# Patient Record
Sex: FEMALE | Race: WHITE
[De-identification: names, ages, dates, MRNs, and addresses within clinical notes are randomized per-mention and may not be internally consistent; named-entity substitution may affect disease eponyms.]

---

## 2018-02-27 ENCOUNTER — HOSPITAL ENCOUNTER (EMERGENCY)
Dept: HOSPITAL 56 - MW.ED | Age: 38
Discharge: HOME | End: 2018-02-27
Payer: MEDICAID

## 2018-02-27 DIAGNOSIS — L03.113: Primary | ICD-10-CM

## 2018-02-27 PROCEDURE — 99283 EMERGENCY DEPT VISIT LOW MDM: CPT

## 2018-02-27 PROCEDURE — 96372 THER/PROPH/DIAG INJ SC/IM: CPT

## 2018-02-27 PROCEDURE — 87070 CULTURE OTHR SPECIMN AEROBIC: CPT

## 2018-02-27 NOTE — EDM.PDOC
ED HPI GENERAL MEDICAL PROBLEM





- General


Chief Complaint: Skin Complaint


Stated Complaint: RIGHT ARM POSS SPIDER BITE


Time Seen by Provider: 02/27/18 17:40


Source of Information: Reports: Patient





- History of Present Illness


INITIAL COMMENTS - FREE TEXT/NARRATIVE: 





HISTORY AND PHYSICAL:


History of present illness:


[Patient presents with cellulitis on right anterior forearm 1 inch in diameter, 

she is an IV drug user, has multiple track marks.





He did have an abscess on her posterior forearm that she drained on her own she 

has some exudates that are loculated at the opening there is no fluctuance or 

surrounding redness and tenderness approximately 1 inch in diameter, and wound 

cultures obtained at this





No fever nausea vomiting chills sweats





]


Review of systems: 


As per history of present illness and below otherwise all systems reviewed and 

negative.


Past medical history: 


As per history of present illness and as reviewed below otherwise 

noncontributory.


Surgical history: 


As per history of present illness and as reviewed below otherwise 

noncontributory.


Social history: 


No reported history of drug or alcohol abuse.


Family history: 


As per history of present illness and as reviewed below otherwise 

noncontributory.


Physical exam:


HEENT: Atraumatic, normocephalic, pupils reactive, negative for conjunctival 

pallor or scleral icterus, mucous membranes moist, throat clear, neck supple, 

nontender, trachea midline.


Lungs: Clear to auscultation, breath sounds equal bilaterally, chest nontender.


Heart: S1S2, regular, negative for clicks, rubs, or JVD.


Abdomen: Soft, nondistended, nontender. Negative for masses or 

hepatosplenomegaly. Negative for costovertebral tenderness.


Pelvis: Stable nontender.


Genitourinary: Deferred.


Rectal: Deferred.


Extremities: Atraumatic, negative for cords or calf pain. Neurovascular 

unremarkable.


Neuro: Awake, alert, oriented. Cranial nerves II through XII unremarkable. 

Cerebellum unremarkable. Motor and sensory unremarkable throughout. Exam 

nonfocal.


Diagnostics:


Culture]


Therapeutics:


[1 g Rocephin


Bactrim double strength by mouth twice a day #20 no refill


]


Impression: 


[]Cellulitis right forearm anterior


Abscess with drainage performed by the patient several days ago on the 

posterior forearm cellulitis surrounds post I&D


Definitive disposition and diagnosis as appropriate pending reevaluation and 

review of above.





ED ROS GENERAL





- Review of Systems


Review Of Systems: ROS reveals no pertinent complaints other than HPI.





ED EXAM, SKIN/RASH


Exam: See Below





Course





- Orders/Labs/Meds


Orders: 


 Active Orders 24 hr











 Category Date Time Status


 


 CULTURE WOUND [RM] Stat Lab  02/27/18 17:40 Ordered














Departure





- Departure


Time of Disposition: 17:42


Disposition: Home, Self-Care 01


Condition: Good


Clinical Impression: 


 Cellulitis








- Discharge Information


Referrals: 


PCP,None [Primary Care Provider] - 


Forms:  ED Department Discharge


Additional Instructions: 


Medication as prescribed


Return if symptoms persist or worsen


Follow-up with primary care in 1 week





Wound culture to follow





[facility] - Primary Care


[address] [place] [id number]


Phone: [phone]


Fax: [phone]








The following information is given to patients seen in the emergency department 

who are being discharged to home. This information is to outline your options 

for follow-up care. We provide all patients seen in our emergency department 

with a follow-up referral.





The need for follow-up, as well as the timing and circumstances, are variable 

depending upon the specifics of your emergency department visit.





If you don't have a primary care physician on staff, we will provide you with a 

referral. We always advise you to contact your personal physician following an 

emergency department visit to inform them of the circumstance of the visit and 

for follow-up with them and/or the need for any referrals to a consulting 

specialist.





The emergency department will also refer you to a specialist when appropriate. 

This referral assures that you have the opportunity for follow-up care with a 

specialist. All of these measure are taken in an effort to provide you with 

optimal care, which includes your follow-up.





Under all circumstances we always encourage you to contact your private 

physician who remains a resource for coordinating your care. When calling for 

follow-up care, please make the office aware that this follow-up is from your 

recent emergency room visit. If for any reason you are refused follow-up, 

please contact the [facility] emergency department at [phone] 

and asked to speak to the emergency department charge nurse.














- My Orders


Last 24 Hours: 


My Active Orders





02/27/18 17:40


CULTURE WOUND [RM] Stat 














- Assessment/Plan


Last 24 Hours: 


My Active Orders





02/27/18 17:40


CULTURE WOUND [RM] Stat

## 2018-05-22 ENCOUNTER — HOSPITAL ENCOUNTER (EMERGENCY)
Dept: HOSPITAL 56 - MW.ED | Age: 38
Discharge: NURSING, REHAB OR LONG-TERM CARE | End: 2018-05-22
Payer: MEDICAID

## 2018-05-22 DIAGNOSIS — F19.10: ICD-10-CM

## 2018-05-22 DIAGNOSIS — E11.649: Primary | ICD-10-CM

## 2018-05-22 DIAGNOSIS — R09.2: ICD-10-CM

## 2018-05-22 DIAGNOSIS — Z88.1: ICD-10-CM

## 2018-05-22 DIAGNOSIS — Z79.4: ICD-10-CM

## 2018-05-22 PROCEDURE — 36415 COLL VENOUS BLD VENIPUNCTURE: CPT

## 2018-05-22 PROCEDURE — 99292 CRITICAL CARE ADDL 30 MIN: CPT

## 2018-05-22 PROCEDURE — 82803 BLOOD GASES ANY COMBINATION: CPT

## 2018-05-22 PROCEDURE — 96361 HYDRATE IV INFUSION ADD-ON: CPT

## 2018-05-22 PROCEDURE — 94002 VENT MGMT INPAT INIT DAY: CPT

## 2018-05-22 PROCEDURE — 83605 ASSAY OF LACTIC ACID: CPT

## 2018-05-22 PROCEDURE — 43753 TX GASTRO INTUB W/ASP: CPT

## 2018-05-22 PROCEDURE — 99291 CRITICAL CARE FIRST HOUR: CPT

## 2018-05-22 PROCEDURE — 80305 DRUG TEST PRSMV DIR OPT OBS: CPT

## 2018-05-22 PROCEDURE — 93005 ELECTROCARDIOGRAM TRACING: CPT

## 2018-05-22 PROCEDURE — 71045 X-RAY EXAM CHEST 1 VIEW: CPT

## 2018-05-22 PROCEDURE — 96374 THER/PROPH/DIAG INJ IV PUSH: CPT

## 2018-05-22 PROCEDURE — 82375 ASSAY CARBOXYHB QUANT: CPT

## 2018-05-22 PROCEDURE — 36600 WITHDRAWAL OF ARTERIAL BLOOD: CPT

## 2018-05-22 PROCEDURE — 81001 URINALYSIS AUTO W/SCOPE: CPT

## 2018-05-22 NOTE — CR
EXAM DATE: 18



PATIENT'S AGE: 37





Patient: JENSEN HOBBS



Facility: [place], [place]

Patient ID: [id number]

Site Patient ID: [id number].

Site Accession #: [id number]

: 1980

Study: XRay Chest [id number]/22/2018 12:21:41 PM

Ordering Physician: Maurice Ferreira



Final Report: 

INDICATION: PT FOUND UNRESPONSIVE



TECHNIQUE:

Chest 1 view 



COMPARISON:

None 



FINDINGS:

Cardiovascular and mediastinum: Heart size and vasculature are normal in 
caliber and appearance. Mediastinum is within normal limits. 



Lungs and pleural space: No focal consolidation. No sign of pleural effusion. 
No pneumothorax. 



Bones: No significant findings. 



Other: 



IMPRESSION:

1. no acute cardiopulmonary disease.

2. Feeding tube crossing the left hemidiaphragm looped within the gastric 
fundus.



Dictated by Bryant Arroyo MD @ 2018 12:38:00 PM







Dictated by: Bryant Arroyo MD @ 2018 12:38:08

(Electronic Signature)







Report Signed by Proxy.
KAZ

## 2018-05-22 NOTE — EDM.PDOC
ED HPI GENERAL MEDICAL PROBLEM





- General


Chief Complaint: General


Stated Complaint: AMBULANCE


Time Seen by Provider: 05/22/18 11:46





- History of Present Illness


INITIAL COMMENTS - FREE TEXT/NARRATIVE: 


HISTORY AND PHYSICAL:





History of present illness:


Patient is a 37-year-old female with history of inflammation and diabetes who 

is found unresponsive face down at all tell by cleaning personnel on arrival 

paramedic had a blood sugar of 30 EJ was established 1 amp of D50 was given 

patient remained unresponsive she was intubated in the field on arrival here 

she remains unresponsive Narcan 0.42 was given with no response.





Review of systems: 


As per history of present illness and below otherwise all systems reviewed and 

negative.





Past medical history: 


As per history of present illness and as reviewed below otherwise 

noncontributory.





Surgical history: 


As per history of present illness and as reviewed below otherwise 

noncontributory.





Social history: 


No reported history of drug or alcohol abuse.





Family history: 


As per history of present illness and as reviewed below otherwise 

noncontributory.





Physical exam:


HEENT: Small areas of ecchymosis noted about her face these appear of variable 

age, normocephalic, pupils reactive, negative for conjunctival pallor or 

scleral icterus, mucous membranes moist, throat clear, c-collar applied , 

trachea midline.


Lungs: Clear to auscultation, breath sounds equal bilaterally, chest nontender.


Heart: S1S2, regular, negative for clicks, rubs, or JVD.


Abdomen: Soft, nondistended, nontender. Negative for masses or 

hepatosplenomegaly. Negative for costovertebral tenderness.


Pelvis: Stable nontender.


Genitourinary: Deferred.


Rectal: Deferred.


Extremities: Track marks noted bilateral upper extremities, 


Neuro: Unresponsive intubated spontaneous movements





Diagnostics:


CBC CMP PT/INR troponin chest x-ray CT brain CT C-spine urine drug screen 

aspirin Tylenol level EtOH prolactin blood culture 2 hCG UA ABG


Therapeutics:


IV monitor mechanical ventilation


Impression


#1 altered mental status #2 ventilatory failure #3 probable substance abuse #4 

hypoglycemic episode history of insulin dependent diabetes


Definitive disposition and diagnosis as appropriate pending reevaluation and 

review of above.








- Related Data


 Allergies











Allergy/AdvReac Type Severity Reaction Status Date / Time


 


clarithromycin [From Biaxin] Allergy  Rash Verified 02/27/18 17:51











Home Meds: 


 Home Meds





Insulin Aspart [Novolog Flexpen] 7 units SQ ASDIRECTED 02/27/18 [History]


Insulin Glarg,Human.Rec.Analog [Lantus] 50 units SQ BEDTIME 02/27/18 [History]











Past Medical History





- Past Health History


Medical/Surgical History: Denies Medical/Surgical History


Endocrine/Metabolic History: Reports: Diabetes, Type II





- Infectious Disease History


Infectious Disease History: Reports: Chicken Pox, Other (See Below)


Other Infectious Disease History: MSSA





Social & Family History





- Family History


Family Medical History: Noncontributory





- Tobacco Use


Smoking Status *Q: Unknown Ever Smoked





ED ROS GENERAL





- Review of Systems


Review Of Systems: ROS reveals no pertinent complaints other than HPI.





ED EXAM, GENERAL





- Physical Exam


Exam: See Below (The dictation)





Course





- Orders/Labs/Meds


Orders: 





 Active Orders 24 hr











 Category Date Time Status


 


 Cardiac Monitoring [RC] .AS DIRECTED Care  05/22/18 11:47 Active


 


 EKG Documentation Completion [RC] STAT Care  05/22/18 11:47 Active


 


 Pulse Oximetry [RC] ASDIRECTED Care  05/22/18 11:47 Active


 


 Cervical Spine wo Cont [CT] Stat Exams  05/22/18 11:48 Ordered


 


 Chest 1V Frontal [CR] Stat Exams  05/22/18 11:48 Ordered


 


 Head wo Cont [CT] Stat Exams  05/22/18 11:48 Ordered


 


 ACETAMINOPHEN [CHEM] Stat Lab  05/22/18 11:49 Ordered


 


 AMMONIA VENOUS [CHEM] Stat Lab  05/22/18 11:47 Ordered


 


 BLOOD GAS ARTERIAL [BG] Stat Lab  05/22/18 11:47 Ordered


 


 CARBOXYHEMOGLOBIN [BG] Stat Lab  05/22/18 11:48 Ordered


 


 CBC WITH AUTO DIFF [HEME] Stat Lab  05/22/18 11:47 Ordered


 


 COMPREHENSIVE METABOLIC PN,CMP [CHEM] Stat Lab  05/22/18 11:47 Ordered


 


 DRUG SCREEN, URINE [URCHEM] Stat Lab  05/22/18 11:48 Ordered


 


 ETHANOL BLOOD MEDICAL [CHEM] Stat Lab  05/22/18 11:47 Ordered


 


 HCG QUALITATIVE,SERUM [CHEM] Stat Lab  05/22/18 11:48 Ordered


 


 INR,PT,PROTHROMBIN TIME [COAG] Stat Lab  05/22/18 11:47 Ordered


 


 LACTATE WITH REFLEX [BG] Stat Lab  05/22/18 11:49 Ordered


 


 PROLACTIN [CHEM] Stat Lab  05/22/18 11:47 Ordered


 


 SALICYLATE [CHEM] Stat Lab  05/22/18 11:47 Ordered


 


 TROPONIN I [CHEM] Stat Lab  05/22/18 11:47 Ordered


 


 UA W/MICROSCOPIC [URIN] Stat Lab  05/22/18 11:48 Ordered


 


 Sodium Chloride 0.9% [Saline Flush] Med  05/22/18 11:47 Active





 10 ml FLUSH ASDIRECTED PRN   


 


 Sodium Chloride 0.9% [Saline Flush] Med  05/22/18 11:47 Active





 2.5 ml FLUSH ASDIRECTED PRN   


 


 Saline Lock Insert [OM.PC] Stat Oth  05/22/18 11:47 Ordered








 Medication Orders





Sodium Chloride (Saline Flush)  10 ml FLUSH ASDIRECTED PRN


   PRN Reason: Keep Vein Open


Sodium Chloride (Saline Flush)  2.5 ml FLUSH ASDIRECTED PRN


   PRN Reason: Keep Vein Open








Meds: 





Medications











Generic Name Dose Route Start Last Admin





  Trade Name Freq  PRN Reason Stop Dose Admin


 


Sodium Chloride  10 ml  05/22/18 11:47  





  Saline Flush  FLUSH   





  ASDIRECTED PRN   





  Keep Vein Open   





     





     





     


 


Sodium Chloride  2.5 ml  05/22/18 11:47  





  Saline Flush  FLUSH   





  ASDIRECTED PRN   





  Keep Vein Open   





     





     





     














Departure





- Departure


Time of Disposition: 12:01


Disposition: DC/Tfer to Acute Hospital 02


Condition: Critical


Clinical Impression: 


 Altered mental status, Respiratory arrest, Substance abuse, Hypoglycemia








- Discharge Information





- My Orders


Last 24 Hours: 





My Active Orders





05/22/18 11:47


Cardiac Monitoring [RC] .AS DIRECTED 


EKG Documentation Completion [RC] STAT 


Pulse Oximetry [RC] ASDIRECTED 


AMMONIA VENOUS [CHEM] Stat 


BLOOD GAS ARTERIAL [BG] Stat 


CBC WITH AUTO DIFF [HEME] Stat 


COMPREHENSIVE METABOLIC PN,CMP [CHEM] Stat 


ETHANOL BLOOD MEDICAL [CHEM] Stat 


INR,PT,PROTHROMBIN TIME [COAG] Stat 


PROLACTIN [CHEM] Stat 


SALICYLATE [CHEM] Stat 


TROPONIN I [CHEM] Stat 


Sodium Chloride 0.9% [Saline Flush]   10 ml FLUSH ASDIRECTED PRN 


Sodium Chloride 0.9% [Saline Flush]   2.5 ml FLUSH ASDIRECTED PRN 


Saline Lock Insert [OM.PC] Stat 





05/22/18 11:48


Cervical Spine wo Cont [CT] Stat 


Chest 1V Frontal [CR] Stat 


Head wo Cont [CT] Stat 


CARBOXYHEMOGLOBIN [BG] Stat 


DRUG SCREEN, URINE [URCHEM] Stat 


HCG QUALITATIVE,SERUM [CHEM] Stat 


UA W/MICROSCOPIC [URIN] Stat 





05/22/18 11:49


ACETAMINOPHEN [CHEM] Stat 


LACTATE WITH REFLEX [BG] Stat 














- Assessment/Plan


Last 24 Hours: 





My Active Orders





05/22/18 11:47


Cardiac Monitoring [RC] .AS DIRECTED 


EKG Documentation Completion [RC] STAT 


Pulse Oximetry [RC] ASDIRECTED 


AMMONIA VENOUS [CHEM] Stat 


BLOOD GAS ARTERIAL [BG] Stat 


CBC WITH AUTO DIFF [HEME] Stat 


COMPREHENSIVE METABOLIC PN,CMP [CHEM] Stat 


ETHANOL BLOOD MEDICAL [CHEM] Stat 


INR,PT,PROTHROMBIN TIME [COAG] Stat 


PROLACTIN [CHEM] Stat 


SALICYLATE [CHEM] Stat 


TROPONIN I [CHEM] Stat 


Sodium Chloride 0.9% [Saline Flush]   10 ml FLUSH ASDIRECTED PRN 


Sodium Chloride 0.9% [Saline Flush]   2.5 ml FLUSH ASDIRECTED PRN 


Saline Lock Insert [OM.PC] Stat 





05/22/18 11:48


Cervical Spine wo Cont [CT] Stat 


Chest 1V Frontal [CR] Stat 


Head wo Cont [CT] Stat 


CARBOXYHEMOGLOBIN [BG] Stat 


DRUG SCREEN, URINE [URCHEM] Stat 


HCG QUALITATIVE,SERUM [CHEM] Stat 


UA W/MICROSCOPIC [URIN] Stat 





05/22/18 11:49


ACETAMINOPHEN [CHEM] Stat 


LACTATE WITH REFLEX [BG] Stat

## 2018-06-08 ENCOUNTER — HOSPITAL ENCOUNTER (OUTPATIENT)
Dept: HOSPITAL 56 - MW.ED | Age: 38
Setting detail: OBSERVATION
LOS: 1 days | Discharge: HOME | End: 2018-06-09
Attending: INTERNAL MEDICINE | Admitting: INTERNAL MEDICINE
Payer: MEDICAID

## 2018-06-08 DIAGNOSIS — Z79.899: ICD-10-CM

## 2018-06-08 DIAGNOSIS — Z79.4: ICD-10-CM

## 2018-06-08 DIAGNOSIS — Z79.2: ICD-10-CM

## 2018-06-08 DIAGNOSIS — R07.89: Primary | ICD-10-CM

## 2018-06-08 DIAGNOSIS — I50.9: ICD-10-CM

## 2018-06-08 DIAGNOSIS — E10.9: ICD-10-CM

## 2018-06-08 DIAGNOSIS — M54.9: ICD-10-CM

## 2018-06-08 LAB
CHLORIDE SERPL-SCNC: 105 MMOL/L (ref 98–107)
CHLORIDE SERPL-SCNC: 94 MMOL/L (ref 98–107)
SODIUM SERPL-SCNC: 128 MMOL/L (ref 136–145)
SODIUM SERPL-SCNC: 139 MMOL/L (ref 136–145)

## 2018-06-08 PROCEDURE — 80053 COMPREHEN METABOLIC PANEL: CPT

## 2018-06-08 PROCEDURE — 96372 THER/PROPH/DIAG INJ SC/IM: CPT

## 2018-06-08 PROCEDURE — 71045 X-RAY EXAM CHEST 1 VIEW: CPT

## 2018-06-08 PROCEDURE — 36600 WITHDRAWAL OF ARTERIAL BLOOD: CPT

## 2018-06-08 PROCEDURE — 85610 PROTHROMBIN TIME: CPT

## 2018-06-08 PROCEDURE — 87086 URINE CULTURE/COLONY COUNT: CPT

## 2018-06-08 PROCEDURE — 83690 ASSAY OF LIPASE: CPT

## 2018-06-08 PROCEDURE — 85379 FIBRIN DEGRADATION QUANT: CPT

## 2018-06-08 PROCEDURE — 96376 TX/PRO/DX INJ SAME DRUG ADON: CPT

## 2018-06-08 PROCEDURE — 96365 THER/PROPH/DIAG IV INF INIT: CPT

## 2018-06-08 PROCEDURE — G0378 HOSPITAL OBSERVATION PER HR: HCPCS

## 2018-06-08 PROCEDURE — C9113 INJ PANTOPRAZOLE SODIUM, VIA: HCPCS

## 2018-06-08 PROCEDURE — 85025 COMPLETE CBC W/AUTO DIFF WBC: CPT

## 2018-06-08 PROCEDURE — 99285 EMERGENCY DEPT VISIT HI MDM: CPT

## 2018-06-08 PROCEDURE — 81001 URINALYSIS AUTO W/SCOPE: CPT

## 2018-06-08 PROCEDURE — 96375 TX/PRO/DX INJ NEW DRUG ADDON: CPT

## 2018-06-08 PROCEDURE — 93005 ELECTROCARDIOGRAM TRACING: CPT

## 2018-06-08 PROCEDURE — 80048 BASIC METABOLIC PNL TOTAL CA: CPT

## 2018-06-08 PROCEDURE — 96361 HYDRATE IV INFUSION ADD-ON: CPT

## 2018-06-08 PROCEDURE — 71250 CT THORAX DX C-: CPT

## 2018-06-08 PROCEDURE — 80305 DRUG TEST PRSMV DIR OPT OBS: CPT

## 2018-06-08 PROCEDURE — 82150 ASSAY OF AMYLASE: CPT

## 2018-06-08 PROCEDURE — 82962 GLUCOSE BLOOD TEST: CPT

## 2018-06-08 PROCEDURE — 36415 COLL VENOUS BLD VENIPUNCTURE: CPT

## 2018-06-08 PROCEDURE — 84484 ASSAY OF TROPONIN QUANT: CPT

## 2018-06-08 PROCEDURE — 82803 BLOOD GASES ANY COMBINATION: CPT

## 2018-06-08 RX ADMIN — FOLIC ACID SCH MG: 5 INJECTION, SOLUTION INTRAMUSCULAR; INTRAVENOUS; SUBCUTANEOUS at 10:14

## 2018-06-08 RX ADMIN — SODIUM CHLORIDE SCH MG: 9 INJECTION, SOLUTION INTRAVENOUS at 10:13

## 2018-06-08 RX ADMIN — CEFTRIAXONE SCH MLS/HR: 1 INJECTION, SOLUTION INTRAVENOUS at 13:07

## 2018-06-08 NOTE — PCM.SN
- Free Text/Narrative


Note: 





Patient was found to be unresponsive, bedside glucose 25, an amp of d50 was 

given. Recheck glucose 138. Patient is now responsive, complaining of ongoing 

pain as prior.

## 2018-06-08 NOTE — PCM.SN
- Free Text/Narrative


Note: 


Anesthesia Note:





Called for difficult IV placement. Initial attempt with 20 Ga to R upper arm - 

vein blew after threading catheter; 2nd attempt with 22 Ga again to R UA and 

this time threaded easily, but blew upon flushing; 3rd attempt 20 GA to R deep 

brachial - again blew upon flushing. Finally superficial vein to L outer AC 

cannulated with 24 ga IV and flushes well. Plan to get pt hydrated and attempt 

and larger bore IV after hydration. Pt moaning and screaming in pain even when 

no painful event occurring. Pt does have multiple scars all over her arms 

consistent with IV drug abuse. Pt verbalizes that she "had to have IVs in her 

neck" in the past.

## 2018-06-08 NOTE — EDM.PDOC
ED HPI GENERAL MEDICAL PROBLEM





- General


Chief Complaint: Chest Pain


Stated Complaint: CHEST PAINS


Time Seen by Provider: 06/08/18 04:43





- History of Present Illness


INITIAL COMMENTS - FREE TEXT/NARRATIVE: 





HISTORY AND PHYSICAL:





History of present illness:


Patient 37-year-old female presents concerned chest pain this is vaguely 

described without associated shortness breath palpitations nausea vomiting 

patient history his history of methamphetamine use she denies current. Patient 

is requesting pain medication on arrival





Review of systems: 


As per history of present illness and below otherwise all systems reviewed and 

negative.





Past medical history: 


As per history of present illness and as reviewed below otherwise 

noncontributory.





Surgical history: 


As per history of present illness and as reviewed below otherwise 

noncontributory.





Social history: 


No reported history of drug or alcohol abuse.





Family history: 


As per history of present illness and as reviewed below otherwise 

noncontributory.





Physical exam:


HEENT: Atraumatic, normocephalic, pupils reactive, negative for conjunctival 

pallor or scleral icterus, mucous membranes moist, throat clear, neck supple, 

nontender, trachea midline.


Lungs: Clear to auscultation, breath sounds equal bilaterally, chest nontender.


Heart: S1S2, regular, negative for clicks, rubs, or JVD.


Abdomen: Soft, nondistended, nontender. Negative for masses or 

hepatosplenomegaly. Negative for costovertebral tenderness.


Pelvis: Stable nontender.


Genitourinary: Deferred.


Rectal: Deferred.


Extremities: Atraumatic, negative for cords or calf pain. Neurovascular 

unremarkable.


Neuro: Awake, alert, anxious, oriented. Cranial nerves II through XII 

unremarkable. Cerebellum unremarkable. Motor and sensory unremarkable 

throughout. Exam nonfocal.





Diagnostics:


CBC CMP troponin PT/INR chest x-ray EKG UDS





Therapeutics:


IV O2 monitor aspirin 324 mg by mouth





Impression: 


1 atypical chest pain #2 uncontrolled diabetes #3 history of substance abuse





Definitive disposition and diagnosis as appropriate pending reevaluation and 

review of above.


  ** right side chest


Pain Score (Numeric/FACES): 10





- Related Data


 Allergies











Allergy/AdvReac Type Severity Reaction Status Date / Time


 


clarithromycin [From Biaxin] Allergy  Rash Verified 06/08/18 04:51











Home Meds: 


 Home Meds





Insulin Aspart [Novolog Flexpen] 7 units SQ ASDIRECTED 02/27/18 [History]


Insulin Glarg,Human.Rec.Analog [Lantus] 50 units SQ BEDTIME 02/27/18 [History]











Past Medical History





- Past Health History


Medical/Surgical History: Denies Medical/Surgical History


HEENT History: Reports: None


Cardiovascular History: Reports: Other (See Below)


Other Cardiovascular History: CHF


Respiratory History: Reports: None


Gastrointestinal History: Reports: None


Genitourinary History: Reports: None


OB/GYN History: Reports: None


Musculoskeletal History: Reports: None


Neurological History: Reports: None


Psychiatric History: Reports: None


Endocrine/Metabolic History: Reports: Diabetes, Type II





- Infectious Disease History


Infectious Disease History: Reports: Chicken Pox


Other Infectious Disease History: MSSA





- Past Surgical History


Female  Surgical History: Reports: None





Social & Family History





- Family History


Family Medical History: Noncontributory





- Tobacco Use


Smoking Status *Q: Current Every Day Smoker


Years of Tobacco use: 16


Packs/Tins Daily: 1





- Recreational Drug Use


Recreational Drug Use: No





ED ROS GENERAL





- Review of Systems


Review Of Systems: ROS reveals no pertinent complaints other than HPI.





ED EXAM, GENERAL





- Physical Exam


Exam: See Below (See dictation)





Course





- Vital Signs


Last Recorded V/S: 


 Last Vital Signs











Temp  37.1 C   06/08/18 04:43


 


Pulse  97   06/08/18 05:48


 


Resp  18   06/08/18 05:48


 


BP  116/68   06/08/18 05:48


 


Pulse Ox  97   06/08/18 05:48














- Orders/Labs/Meds


Orders: 


 Active Orders 24 hr











 Category Date Time Status


 


 Cardiac Monitoring [RC] .AS DIRECTED Care  06/08/18 04:51 Active


 


 EKG Documentation Completion [RC] STAT Care  06/08/18 04:50 Active


 


 Chest 1V Frontal [CR] Stat Exams  06/08/18 04:51 Taken


 


 ABG [BLOOD GAS ARTERIAL] [BG] Stat Lab  06/08/18 06:02 Ordered


 


 DRUG SCREEN, URINE [URCHEM] Stat Lab  06/08/18 04:51 Ordered


 


 UA W/MICROSCOPIC [URIN] Stat Lab  06/08/18 04:51 Ordered


 


 Sodium Chloride 0.9% [Normal Saline] 1,000 ml Med  06/08/18 05:00 Active





 IV STAT   


 


 Sodium Chloride 0.9% [Saline Flush] Med  06/08/18 04:51 Active





 10 ml FLUSH ASDIRECTED PRN   


 


 Sodium Chloride 0.9% [Saline Flush] Med  06/08/18 04:51 Active





 2.5 ml FLUSH ASDIRECTED PRN   


 


 Saline Lock Insert [OM.PC] Stat Oth  06/08/18 04:51 Ordered








 Medication Orders





Sodium Chloride (Normal Saline)  1,000 mls @ 125 mls/hr IV STAT [facility]


   Last Infusion: 06/08/18 06:03  Dose: 999 mls/hr


   Admin: 06/08/18 05:01  Dose: 125 mls/hr


Sodium Chloride (Saline Flush)  10 ml FLUSH ASDIRECTED PRN


   PRN Reason: Keep Vein Open


Sodium Chloride (Saline Flush)  2.5 ml FLUSH ASDIRECTED PRN


   PRN Reason: Keep Vein Open








Labs: 


 Laboratory Tests











  06/08/18 06/08/18 06/08/18 Range/Units





  05:12 05:12 05:12 


 


WBC  10.13    (4.0-11.0)  K/uL


 


RBC  3.93 L    (4.30-5.90)  M/uL


 


Hgb  12.1    (12.0-16.0)  g/dL


 


Hct  37.5    (36.0-46.0)  %


 


MCV  95.4    (80.0-98.0)  fL


 


MCH  30.8    (27.0-32.0)  pg


 


MCHC  32.3    (31.0-37.0)  g/dL


 


RDW Std Deviation  44.0    (28.0-62.0)  fl


 


RDW Coeff of Reza  13    (11.0-15.0)  %


 


Plt Count  321    (150-400)  K/uL


 


MPV  10.30    (7.40-12.00)  fL


 


Neut % (Auto)  70.2    (48.0-80.0)  %


 


Lymph % (Auto)  20.1    (16.0-40.0)  %


 


Mono % (Auto)  8.9    (0.0-15.0)  %


 


Eos % (Auto)  0.6    (0.0-7.0)  %


 


Baso % (Auto)  0.2    (0.0-1.5)  %


 


Neut # (Auto)  7.1 H    (1.4-5.7)  K/uL


 


Lymph # (Auto)  2.0    (0.6-2.4)  K/uL


 


Mono # (Auto)  0.9 H    (0.0-0.8)  K/uL


 


Eos # (Auto)  0.1    (0.0-0.7)  K/uL


 


Baso # (Auto)  0.0    (0.0-0.1)  K/uL


 


Nucleated RBC %  0.0    /100WBC


 


Nucleated RBCs #  0    K/uL


 


INR   0.97   


 


Sodium    128 L  (136-145)  mmol/L


 


Potassium    4.3  (3.5-5.1)  mmol/L


 


Chloride    94 L  ()  mmol/L


 


Carbon Dioxide    21.2  (21.0-32.0)  mmol/L


 


BUN    14  (7.0-18.0)  mg/dL


 


Creatinine    1.0  (0.6-1.0)  mg/dL


 


Est Cr Clr Drug Dosing    71.41  mL/min


 


Estimated GFR (MDRD)    > 60.0  ml/min


 


Glucose    726 H*  ()  mg/dL


 


Calcium    8.7  (8.5-10.1)  mg/dL


 


Total Bilirubin    0.4  (0.2-1.0)  mg/dL


 


AST    74 H  (15-37)  IU/L


 


ALT    67 H  (14-63)  IU/L


 


Alkaline Phosphatase    139 H  ()  U/L


 


Troponin I    < 0.050  (0.000-0.056)  ng/mL


 


Total Protein    7.2  (6.4-8.2)  g/dL


 


Albumin    3.4  (3.4-5.0)  g/dL


 


Globulin    3.8 H  (2.0-3.5)  g/dL


 


Albumin/Globulin Ratio    0.9 L  (1.3-2.8)  











Meds: 


Medications











Generic Name Dose Route Start Last Admin





  Trade Name Freq  PRN Reason Stop Dose Admin


 


Sodium Chloride  1,000 mls @ 125 mls/hr  06/08/18 05:00  06/08/18 06:03





  Normal Saline  IV   999 mls/hr





  STAT EVARISTO   Infusion





     





     





     





     


 


Sodium Chloride  10 ml  06/08/18 04:51  





  Saline Flush  FLUSH   





  ASDIRECTED PRN   





  Keep Vein Open   





     





     





     


 


Sodium Chloride  2.5 ml  06/08/18 04:51  





  Saline Flush  FLUSH   





  ASDIRECTED PRN   





  Keep Vein Open   





     





     





     














Discontinued Medications














Generic Name Dose Route Start Last Admin





  Trade Name Freq  PRN Reason Stop Dose Admin


 


Aspirin  324 mg  06/08/18 04:51  06/08/18 05:02





  Aspirin  PO  06/08/18 04:52  324 mg





  ONETIME ONE   Administration





     





     





     





     


 


Insulin Human Regular 100 unit  100 mls @ 6 mls/hr  06/08/18 06:15  





  / Sodium Chloride  IV   





  TITRATE [facility]ARISTO   





     





     





  Protocol   





  6 UNIT/HR   


 


Insulin Human Regular  20 unit  06/08/18 06:14  





  Novolin R  SUBCUT  06/08/18 06:15  





  NOW STA   





     





     





  Protocol   





     


 


Insulin Human Regular  10 unit  06/08/18 06:15  





  Novolin R  IVPUSH  06/08/18 06:16  





  ONETIME ONE   





     





     





  Protocol   





     














Departure





- Departure


Time of Disposition: 05:20


Disposition: Refer to Observation


Condition: Good


Clinical Impression: 


 Atypical chest pain, Uncontrolled diabetes mellitus








- Discharge Information


Referrals: 


PCP,None [Primary Care Provider] - 


Forms:  ED Department Discharge


Additional Instructions: 


The following information is given to patients seen in the emergency department 

who are being discharged to home. This information is to outline your options 

for follow-up care. We provide all patients seen in our emergency department 

with a follow-up referral.





The need for follow-up, as well as the timing and circumstances, are variable 

depending upon the specifics of your emergency department visit.





If you don't have a primary care physician on staff, we will provide you with a 

referral. We always advise you to contact your personal physician following an 

emergency department visit to inform them of the circumstance of the visit and 

for follow-up with them and/or the need for any referrals to a consulting 

specialist.





The emergency department will also refer you to a specialist when appropriate. 

This referral assures that you have the opportunity for followup care with a 

specialist. All of these measure are taken in an effort to provide you with 

optimal care, which includes your followup.





Under all circumstances we always encourage you to contact your private 

physician who remains a resource for coordinating  your care. When calling for 

followup care, please make the office aware that this follow-up is from your 

recent emergency room visit. If for any reason you are refused follow-up, 

please contact the [facility] emergency department at [phone] 

and asked to speak to the emergency department charge nurse.








[facility]


Primary Care


[address] [place] [id number]


Phone: [phone]


Fax: [phone]











Follow-up primary medical doctor return as needed as discussed





- My Orders


Last 24 Hours: 


My Active Orders





06/08/18 04:50


EKG Documentation Completion [RC] STAT 





06/08/18 04:51


Cardiac Monitoring [RC] .AS DIRECTED 


Chest 1V Frontal [CR] Stat 


DRUG SCREEN, URINE [URCHEM] Stat 


UA W/MICROSCOPIC [URIN] Stat 


Sodium Chloride 0.9% [Saline Flush]   10 ml FLUSH ASDIRECTED PRN 


Sodium Chloride 0.9% [Saline Flush]   2.5 ml FLUSH ASDIRECTED PRN 


Saline Lock Insert [OM.PC] Stat 





06/08/18 05:00


Sodium Chloride 0.9% [Normal Saline] 1,000 ml IV STAT 





06/08/18 06:02


ABG [BLOOD GAS ARTERIAL] [BG] Stat 














- Assessment/Plan


Last 24 Hours: 


My Active Orders





06/08/18 04:50


EKG Documentation Completion [RC] STAT 





06/08/18 04:51


Cardiac Monitoring [RC] .AS DIRECTED 


Chest 1V Frontal [CR] Stat 


DRUG SCREEN, URINE [URCHEM] Stat 


UA W/MICROSCOPIC [URIN] Stat 


Sodium Chloride 0.9% [Saline Flush]   10 ml FLUSH ASDIRECTED PRN 


Sodium Chloride 0.9% [Saline Flush]   2.5 ml FLUSH ASDIRECTED PRN 


Saline Lock Insert [OM.PC] Stat 





06/08/18 05:00


Sodium Chloride 0.9% [Normal Saline] 1,000 ml IV STAT 





06/08/18 06:02


ABG [BLOOD GAS ARTERIAL] [BG] Stat

## 2018-06-09 RX ADMIN — FOLIC ACID SCH MG: 5 INJECTION, SOLUTION INTRAMUSCULAR; INTRAVENOUS; SUBCUTANEOUS at 09:21

## 2018-06-09 RX ADMIN — CEFTRIAXONE SCH MLS/HR: 1 INJECTION, SOLUTION INTRAVENOUS at 12:11

## 2018-06-09 RX ADMIN — SODIUM CHLORIDE SCH MG: 9 INJECTION, SOLUTION INTRAVENOUS at 09:22

## 2018-06-09 NOTE — PCM.DCSUM1
**Discharge Summary





- Discharge Data


Discharge Date: 06/09/18


Discharge Disposition: Home, Self-Care 01


Condition: Good





- Patient Summary/Data


Consults: 


 Consultations





06/08/18 08:10


Consult to Diabetic Nurse Specialist [CONS] Routine 











Hospital Course: 





36 yo female with pmh of meth abuse and poorly controlled diabetes who 

presented with one day chest wall and back pain.  Her glucose was found to be 

786. Patient reported not taking her insulin the day of and before admission.  

Work up for her chest pain was negative including, troponins, EKG, and CT scan 

of chest. During her stay she would become tearful and refuse laboratory draws 

and CT scans stating the pain was to severe and only agreeing to testing if she 

received pain medications. She would also go from not moving her arms because 

of the pain to moving her arms dynamically while talking.  She was treated with 

IV fluids and her insulin was resumed.  Patient was discharged home to follow 

up with [facility].





- Patient Instructions


Diet: Diabetic Diet





- Discharge Plan


Prescriptions/Med Rec: 


Cephalexin [Keflex] 500 mg PO Q12H #8 cap


Home Medications: 


 Home Meds





Insulin Aspart [Novolog Flexpen] 7 units SQ ASDIRECTED 02/27/18 [History]


Gabapentin [Neurontin] 300 mg PO TID 06/08/18 [History]


Insulin Detemir [Levemir] 45 unit SUBCUT DAILY 06/08/18 [History]


Cephalexin [Keflex] 500 mg PO Q12H #8 cap 06/09/18 [Rx]








Patient Handouts:  Type 1 Diabetes Mellitus, Self Care, Adult, Nonspecific 

Chest Pain, Easy-to-Read


Referrals: 


Brayden Read [Resident] - 06/15/18 9:30 am





- Patient Data


Vitals - Most Recent: 


 Last Vital Signs











Temp  36.9 C   06/09/18 08:00


 


Pulse  107 H  06/09/18 08:00


 


Resp  18   06/09/18 08:00


 


BP  105/69   06/09/18 08:00


 


Pulse Ox  107 H  06/09/18 08:00











Weight - Most Recent: 75.795 kg


I&O - Last 24 hours: 


 Intake & Output











 06/08/18 06/09/18 06/09/18





 22:59 06:59 14:59


 


Intake Total  3210 


 


Output Total  4800 


 


Balance  -1590 











Lab Results - Last 24 hrs: 


 Laboratory Results - last 24 hr











  06/08/18 06/08/18 06/08/18 Range/Units





  08:32 11:10 11:35 


 


D-Dimer, Quantitative     (0.0-0.52)  mg/LFEU


 


Sodium     (136-145)  mmol/L


 


Potassium     (3.5-5.1)  mmol/L


 


Chloride     ()  mmol/L


 


Carbon Dioxide     (21.0-32.0)  mmol/L


 


BUN     (7.0-18.0)  mg/dL


 


Creatinine     (0.6-1.0)  mg/dL


 


Est Cr Clr Drug Dosing     mL/min


 


Estimated GFR (MDRD)     ml/min


 


Glucose     ()  mg/dL


 


POC Glucose  272 H  25 L  138 H  ()  mg/dL


 


Calcium     (8.5-10.1)  mg/dL


 


Troponin I     (0.000-0.056)  ng/mL














  06/08/18 06/08/18 06/08/18 Range/Units





  12:15 12:15 12:25 


 


D-Dimer, Quantitative    0.30  (0.0-0.52)  mg/LFEU


 


Sodium  139    (136-145)  mmol/L


 


Potassium  4.0    (3.5-5.1)  mmol/L


 


Chloride  105    ()  mmol/L


 


Carbon Dioxide  25.6    (21.0-32.0)  mmol/L


 


BUN  10    (7.0-18.0)  mg/dL


 


Creatinine  0.7    (0.6-1.0)  mg/dL


 


Est Cr Clr Drug Dosing  102.01    mL/min


 


Estimated GFR (MDRD)  > 60.0    ml/min


 


Glucose  91    ()  mg/dL


 


POC Glucose     ()  mg/dL


 


Calcium  8.5    (8.5-10.1)  mg/dL


 


Troponin I   < 0.050   (0.000-0.056)  ng/mL














  06/08/18 06/08/18 06/09/18 Range/Units





  17:16 17:28 06:16 


 


D-Dimer, Quantitative     (0.0-0.52)  mg/LFEU


 


Sodium     (136-145)  mmol/L


 


Potassium     (3.5-5.1)  mmol/L


 


Chloride     ()  mmol/L


 


Carbon Dioxide     (21.0-32.0)  mmol/L


 


BUN     (7.0-18.0)  mg/dL


 


Creatinine     (0.6-1.0)  mg/dL


 


Est Cr Clr Drug Dosing     mL/min


 


Estimated GFR (MDRD)     ml/min


 


Glucose     ()  mg/dL


 


POC Glucose  198 H   434 H  ()  mg/dL


 


Calcium     (8.5-10.1)  mg/dL


 


Troponin I   < 0.050   (0.000-0.056)  ng/mL











Med Orders - Current: 


 Current Medications





Folic Acid (Folic Acid)  1 mg SUBCUT DAILY [facility]


   Last Admin: 06/09/18 09:21 Dose:  1 mg


Ceftriaxone Sodium/Dextrose 1 (gm/ Premix)  50 mls @ 100 mls/hr IV Q24H [facility]


   Last Admin: 06/08/18 13:07 Dose:  100 mls/hr


Insulin Aspart (Novolog)  0 unit SUBCUT TIDAC [facility]; Protocol


   Last Admin: 06/09/18 07:01 Dose:  Not Given


Lorazepam (Ativan)  0 mg IVPUSH Q4H PRN; Protocol


   PRN Reason: CIWAA assessment


   Last Admin: 06/08/18 22:29 Dose:  1 mg


Ondansetron HCl (Zofran)  4 mg IVPUSH Q4H PRN


   PRN Reason: Nausea


Pantoprazole Sodium (Protonix Iv***)  40 mg IVPUSH DAILY [facility]


   Last Admin: 06/09/18 09:22 Dose:  40 mg


Promethazine HCl (Phenergan)  12.5 mg IM Q4H PRN


   PRN Reason: Nausea/Vomiting


Sodium Chloride (Saline Flush)  10 ml FLUSH ASDIRECTED PRN


   PRN Reason: Keep Vein Open


Sodium Chloride (Saline Flush)  2.5 ml FLUSH ASDIRECTED PRN


   PRN Reason: Keep Vein Open


Thiamine HCl (Vitamin B-1)  100 mg IV DAILY [facility]


   Last Admin: 06/09/18 09:22 Dose:  100 mg





Discontinued Medications





Aspirin (Aspirin)  324 mg PO ONETIME ONE


   Stop: 06/08/18 04:52


   Last Admin: 06/08/18 05:02 Dose:  324 mg


Al Hydroxide/Mg Hydroxide 15 ml/ Metoclopramide HCl 5 mg/Lidocaine HCl 5 ml  0 

ml PO ONETIME ONE


   Stop: 06/08/18 09:28


   Last Admin: 06/08/18 10:19 Dose:  1 each


Dextrose/Water (Dextrose 50% In Water)  50 ml IVPUSH STAT ONE


   Stop: 06/08/18 11:12


   Last Admin: 06/08/18 11:17 Dose:  50 ml


Sodium Chloride (Normal Saline)  1,000 mls @ 175 mls/hr IV STAT [facility]


   Last Infusion: 06/08/18 06:03 Dose:  999 mls/hr


Insulin Human Regular 100 unit (/ Sodium Chloride)  100 mls @ 6 mls/hr IV 

TITRATE [facility]; Protocol


Sodium Chloride (Normal Saline)  1,000 mls @ 175 mls/hr IV ASDIRECTED [facility]


   Last Admin: 06/08/18 18:29 Dose:  175 mls/hr


Sodium Chloride (Normal Saline)  1,000 mls @ 999 mls/hr IV .Bolus ONE


   Stop: 06/08/18 10:29


   Last Admin: 06/08/18 09:30 Dose:  999 mls/hr


Insulin Aspart (Novolog)  0 unit SUBCUT TIDAC [facility]; Protocol


Insulin Aspart (Novolog)  0 unit SUBCUT Q6H [facility]; Protocol


   Last Admin: 06/08/18 18:01 Dose:  1 unit


Insulin Aspart (Novolog)  10 unit SUBCUT ONETIME ONE


   Stop: 06/09/18 07:02


   Last Admin: 06/09/18 07:22 Dose:  10 unit


Insulin Human Regular (Novolin R)  20 unit SUBCUT NOW STA; Protocol


   Stop: 06/08/18 06:15


   Last Admin: 06/08/18 06:30 Dose:  20 unit


Insulin Human Regular (Novolin R)  10 unit IVPUSH ONETIME ONE; Protocol


   Stop: 06/08/18 06:16


   Last Admin: 06/08/18 06:30 Dose:  10 unit


Morphine Sulfate (Morphine)  2 mg IVPUSH Q4H PRN


   PRN Reason: Pain (severe 7-10)


   Stop: 06/09/18 08:11


   Last Admin: 06/09/18 04:31 Dose:  2 mg


Morphine Sulfate (Morphine)  1 mg IVPUSH ONETIME ONE


   Stop: 06/09/18 09:07


   Last Admin: 06/09/18 09:18 Dose:  1 mg

## 2018-06-11 NOTE — CT
EXAM DATE: 18



PATIENT'S AGE: 37



Patient: JENSEN HOBBS



Facility: [place], [place]

Patient ID: [id number]

Site Patient ID: [id number].

Site Accession #: [id number].

: 1980

Study: CT Chest [id number]/9/2018 9:41:55 AM

Ordering Physician: Ayala Hargrove



Final Report: 

INDICATION: chest wall pain



HISTORY:

Chest wall pain.



COMPARISON:

None.



TECHNIQUE:

CT of the chest. No intravenous contrast. Coronal/sagittal reconstruction 
images.



FINDINGS:

Nonenlarged axillary lymph nodes. No pleural or pericardial effusion. Ascending 
thoracic aorta measures 3.2 cm in dimension. This is within normal limits. 
Borderline dilation of the main pulmonary artery at 30 mm. No pleural or 
pericardial effusion. The lung windows demonstrate no endobronchial mass. There 
is no bronchiectasis. There is minimal atelectasis at the left lung base. There 
is no suspicious pulmonary nodule. There is no acute airspace disease. There is 
no pneumothorax.

Evaluation the upper abdomen demonstrates no adrenal mass. Included segments of 
the liver, spleen, and adrenal glands are within normal limits. No 
hydronephrosis. Multiple pancreatic parenchymal calcifications, compatible 
chronic pancreatitis. The bone windows demonstrate no lytic or blastic bone 
lesions. The alignment is preserved. On sagittal reconstruction images, the 
vertebral body heights and alignment are maintained.



IMPRESSION:

1. No findings to explain chest wall pain.

2. There is no displaced rib fracture or chest wall hematoma.

3. The sternum appears intact on CT.

4. No thoracic lymphadenopathy or acute airspace disease.

5. Multiple pancreatic parenchymal calcifications, compatible with chronic 
pancreatitis.



Dictated by Fuentes Yost MD @ 2018 10:01:18 AM



Please note that all CT scans at this facility use dose modulation, iterative 
reconstruction, and/or weight-based dosing when appropriate to reduce radiation 
dose to as low as reasonably achievable.



Dictated by: Fuentes Yost MD @ 2018 10:01:38

(Electronic Signature)





Report Signed by Proxy.
[facility]